# Patient Record
(demographics unavailable — no encounter records)

---

## 2025-04-03 NOTE — PHYSICAL EXAM
[Alert] : alert [Oriented to Person] : oriented to person [Oriented to Place] : oriented to place [Oriented to Time] : oriented to time [Calm] : calm [de-identified] : She  is alert, well-groomed, and cheerful.\par    [de-identified] : anicteric.  Nasal mucosa pink, septum midline. Oral mucosa pink.  Tongue midline, Pharynx without exudates.\par    [de-identified] : Neck supple. Trachea midline. Thyroid isthmus barely palpable, lobes not felt.\par    [de-identified] : left breast mastectomy well healed scar. no palpable recurrences.   right  breast no palpable masses, no axillary or supraclavicular lymph adenopathy.

## 2025-04-03 NOTE — HISTORY OF PRESENT ILLNESS
[de-identified] : This is  a 82  year   old patient presenting today for a breast exam and to discuss the results of her last breast imaging. Patient had a Right   Mammogram on 2025 .  Deemed BIRADS category 2 .   Patient reports no interval changes to her overall health status or medical history. Ms. MORA is reporting discomfort in the left mastectomy site. She   denies any trauma to the breast, denies skin changes and she has no spontaneous nipple discharge. Patient denies any fever, night sweats or loss of appetite. Patient is on Exemestane 25 mg by Dr. Ashwin Pillai.  PERTINENT HISTORY:  There is no family history of breast carcinoma. Menarche at age 14, -3 para-3 and her last menstrual period was she was 55.  -Patient referred by Dr. Ad Mata with the chief complaint of having a Left breast mass. -Patient had a BL breast US and mammogram on 2020 that was deemed a BIRADS 0.  -She had a spot compressions of the left breast on 2019 it was deemed a BIRADS 0.   she had an MRI BL breast at the different imaging facility that is requesting previous studies prior to making the diagnosis. -Ms. MORA is s/p US guided left breast biopsy on 2020. 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 - patient had an MRI of the left breast on 2020 to confirm proper placement of the clip. the reports states that sonographically identified and biopsied lesion does not correspond to previously described suspicious lesion on diagnostic MRI of the breast.  - S/P Modified left radical mastectomy with sentinel node biopsy on 10/16/2020. Patient's pathology results were consistent with Invasive moderately differentiated lobular carcinoma. Two sentinel lymph nodes, negative for metastatic carcinoma. Patient is on Exemestane 25 mg by Dr. Ashwin Pillai.

## 2025-04-03 NOTE — PLAN
[FreeTextEntry1] : Ms. MORA  is presenting  today for an evaluation .  she is doing well and offers no complaints.  Results of  her last   imaging and physical examination findings were discussed in details.  Significance of the findings were discussed.    She  was advised to have  right   Mammogram  in  January  and return after the tests. Importance of monthly self-breast examination was reinforced.  Patient's questions and concerns addressed to patient's satisfaction.     continue exemestane as per oncology

## 2025-04-03 NOTE — HISTORY OF PRESENT ILLNESS
[de-identified] : This is  a 82  year   old patient presenting today for a breast exam and to discuss the results of her last breast imaging. Patient had a Right   Mammogram on 2025 .  Deemed BIRADS category 2 .   Patient reports no interval changes to her overall health status or medical history. Ms. MORA is reporting discomfort in the left mastectomy site. She   denies any trauma to the breast, denies skin changes and she has no spontaneous nipple discharge. Patient denies any fever, night sweats or loss of appetite. Patient is on Exemestane 25 mg by Dr. Ashwin Pillai.  PERTINENT HISTORY:  There is no family history of breast carcinoma. Menarche at age 14, -3 para-3 and her last menstrual period was she was 55.  -Patient referred by Dr. Ad Mata with the chief complaint of having a Left breast mass. -Patient had a BL breast US and mammogram on 2020 that was deemed a BIRADS 0.  -She had a spot compressions of the left breast on 2019 it was deemed a BIRADS 0.   she had an MRI BL breast at the different imaging facility that is requesting previous studies prior to making the diagnosis. -Ms. MORA is s/p US guided left breast biopsy on 2020. 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 - patient had an MRI of the left breast on 2020 to confirm proper placement of the clip. the reports states that sonographically identified and biopsied lesion does not correspond to previously described suspicious lesion on diagnostic MRI of the breast.  - S/P Modified left radical mastectomy with sentinel node biopsy on 10/16/2020. Patient's pathology results were consistent with Invasive moderately differentiated lobular carcinoma. Two sentinel lymph nodes, negative for metastatic carcinoma. Patient is on Exemestane 25 mg by Dr. Ashwin Pillai.

## 2025-04-03 NOTE — PHYSICAL EXAM
[Alert] : alert [Oriented to Person] : oriented to person [Oriented to Place] : oriented to place [Oriented to Time] : oriented to time [Calm] : calm [de-identified] : She  is alert, well-groomed, and cheerful.\par    [de-identified] : anicteric.  Nasal mucosa pink, septum midline. Oral mucosa pink.  Tongue midline, Pharynx without exudates.\par    [de-identified] : Neck supple. Trachea midline. Thyroid isthmus barely palpable, lobes not felt.\par    [de-identified] : left breast mastectomy well healed scar. no palpable recurrences.   right  breast no palpable masses, no axillary or supraclavicular lymph adenopathy.